# Patient Record
Sex: FEMALE | Race: WHITE | URBAN - METROPOLITAN AREA
[De-identification: names, ages, dates, MRNs, and addresses within clinical notes are randomized per-mention and may not be internally consistent; named-entity substitution may affect disease eponyms.]

---

## 2017-03-05 ENCOUNTER — TELEPHONE (OUTPATIENT)
Dept: NURSING | Facility: CLINIC | Age: 57
End: 2017-03-05

## 2017-03-05 NOTE — TELEPHONE ENCOUNTER
"Call Type: Triage Call    Presenting Problem: \"Bit in the finger by neighbor's dog.\"  Triage Note:  Guideline Title: Bites - Animal or Human  Recommended Disposition: See ED Immediately  Original Inclination: Wanted to speak with a nurse  Override Disposition:  Intended Action: Follow advice given  Physician Contacted: No  Any full thickness puncture wound (passes through the skin layers of epidermis and  dermis and penetrates into the underlying subcutaneous tissue) OR a puncture  wound where the bottom of wound is not visible (even when wound edges are  ) ?  YES  New or worsening signs and symptoms that may indicate shock ? NO  Blood spurting from wound despite firm pressure or large amount of blood loss  (such as soaked hand towel) ? NO  Bite resulted in complete or partial amputation of finger or toe ? NO  Bite to ear, breast/nipple, penis or vulva AND tissue loss or ongoing bleeding not  controlled with pressure ? NO  Physician Instructions:  Care Advice: Another adult should drive.  Apply cloth-covered ice pack to area to relieve pain and swelling.  Control Bleeding:   - Cover wound with a clean cloth and apply firm  pressure to control bleeding.   - If bleeding continues through dressing,  add more material.   - DO NOT remove old dressing.   - Continue to apply  direct pressure.  Contact the local animal control agency if: - the animal was a wild animal,  stray dog or cat  - the attack was unprovoked  - the bite was from an animal that was aggressive or looks sick, has not  been vaccinated for rabies, or rabies vaccine status unknown.  Cleanse wound by flushing with cool water or normal saline, if available,  and let it bleed to remove as much foreign material as possible. Do not use  hydrogen peroxide, iodine or rubbing alcohol when cleaning the wound as  these products may cause more tissue damage.  "

## 2017-05-26 ENCOUNTER — HEALTH MAINTENANCE LETTER (OUTPATIENT)
Age: 57
End: 2017-05-26

## 2019-09-28 ENCOUNTER — HEALTH MAINTENANCE LETTER (OUTPATIENT)
Age: 59
End: 2019-09-28

## 2021-01-09 ENCOUNTER — HEALTH MAINTENANCE LETTER (OUTPATIENT)
Age: 61
End: 2021-01-09

## 2021-08-28 ENCOUNTER — HEALTH MAINTENANCE LETTER (OUTPATIENT)
Age: 61
End: 2021-08-28

## 2021-10-23 ENCOUNTER — HEALTH MAINTENANCE LETTER (OUTPATIENT)
Age: 61
End: 2021-10-23

## 2022-02-12 ENCOUNTER — HEALTH MAINTENANCE LETTER (OUTPATIENT)
Age: 62
End: 2022-02-12

## 2022-04-09 ENCOUNTER — HEALTH MAINTENANCE LETTER (OUTPATIENT)
Age: 62
End: 2022-04-09

## 2022-10-09 ENCOUNTER — HEALTH MAINTENANCE LETTER (OUTPATIENT)
Age: 62
End: 2022-10-09

## 2022-11-26 ENCOUNTER — HEALTH MAINTENANCE LETTER (OUTPATIENT)
Age: 62
End: 2022-11-26

## 2023-02-18 ENCOUNTER — HEALTH MAINTENANCE LETTER (OUTPATIENT)
Age: 63
End: 2023-02-18

## 2023-06-10 ENCOUNTER — HEALTH MAINTENANCE LETTER (OUTPATIENT)
Age: 63
End: 2023-06-10

## 2023-10-28 ENCOUNTER — HEALTH MAINTENANCE LETTER (OUTPATIENT)
Age: 63
End: 2023-10-28

## 2024-01-06 ENCOUNTER — HEALTH MAINTENANCE LETTER (OUTPATIENT)
Age: 64
End: 2024-01-06

## 2024-03-16 ENCOUNTER — HEALTH MAINTENANCE LETTER (OUTPATIENT)
Age: 64
End: 2024-03-16